# Patient Record
(demographics unavailable — no encounter records)

---

## 2024-11-05 NOTE — DISCUSSION/SUMMARY
[FreeTextEntry1] : My cumulative time spent on today's visit included: Preparation for the visit, review of the medical records, review of pertinent diagnostic studies, review and integration of laboratory data, coordination of care, examination and counseling of the patient on the above diagnosis, treatment plan and prognosis, orders of diagnostic tests and any additional lab data ordered today, medications and/or appropriate procedures and documentation in the medical records of today's visit. Patient advised and verbally acknowledged to make a follow up appt 3-4 months and to follow up for any change in their condition.

## 2024-11-05 NOTE — HISTORY OF PRESENT ILLNESS
[FreeTextEntry1] : GLORIA MATOS is a 46 year F w/ pmhx of HTN, anemia, pre DM, MVP who presents for routine follow up.   Of note, pt reports she gets a slight tingling sensation on her Right side of her head that lasts for 1-2 seconds that occurs when she is cold or nervous.   The patient here for evaluation of high blood pressure. Patient is currently tolerating the current antihypertensive regime and they deny headaches, stiff neck, visual changes, or PND. The patient has been trying to stay on a low-sodium diet.   The patient is also here for f/u of pre-diabetes's on prior blood test the patient is trying to follow a low carb diet and avoid simple sugars. they deny excessive thirst or urination and any blurred vision Pt is here to follow up mild anemia reports no black stools, BRBPR or blood in urine  Patient is here for follow-up with history of mitral valve prolapse and mitral regurgitation. Patient states that they have occasional palpitations which are rare and do not have any significant change in exercise tolerance. There is some occasional dyspnea on extreme exertion reported but no change in pattern.  Pt with HyperPTH was advised by Dr. Luna to proceed with surgery for parathyroidectomy for adenomas on CT.  Pt has not yet proceeded with surgery. Patient pending to follow up with Dr. Luna.